# Patient Record
Sex: MALE | Race: WHITE | NOT HISPANIC OR LATINO | Employment: PART TIME | URBAN - METROPOLITAN AREA
[De-identification: names, ages, dates, MRNs, and addresses within clinical notes are randomized per-mention and may not be internally consistent; named-entity substitution may affect disease eponyms.]

---

## 2023-01-12 ENCOUNTER — OFFICE VISIT (OUTPATIENT)
Dept: FAMILY MEDICINE | Facility: CLINIC | Age: 23
End: 2023-01-12
Payer: COMMERCIAL

## 2023-01-12 VITALS
SYSTOLIC BLOOD PRESSURE: 115 MMHG | DIASTOLIC BLOOD PRESSURE: 73 MMHG | WEIGHT: 216 LBS | BODY MASS INDEX: 29.26 KG/M2 | HEART RATE: 74 BPM | OXYGEN SATURATION: 100 % | HEIGHT: 72 IN | TEMPERATURE: 97.5 F | RESPIRATION RATE: 16 BRPM

## 2023-01-12 DIAGNOSIS — K59.00 CONSTIPATION, UNSPECIFIED CONSTIPATION TYPE: ICD-10-CM

## 2023-01-12 DIAGNOSIS — F41.1 GAD (GENERALIZED ANXIETY DISORDER): Primary | ICD-10-CM

## 2023-01-12 PROCEDURE — 96127 BRIEF EMOTIONAL/BEHAV ASSMT: CPT | Performed by: INTERNAL MEDICINE

## 2023-01-12 PROCEDURE — 99203 OFFICE O/P NEW LOW 30 MIN: CPT | Performed by: INTERNAL MEDICINE

## 2023-01-12 RX ORDER — HYDROXYZINE PAMOATE 50 MG/1
50 CAPSULE ORAL 3 TIMES DAILY PRN
Qty: 90 CAPSULE | Refills: 1 | Status: SHIPPED | OUTPATIENT
Start: 2023-01-12 | End: 2023-10-24

## 2023-01-12 RX ORDER — POLYETHYLENE GLYCOL 3350 17 G/17G
17 POWDER, FOR SOLUTION ORAL DAILY
Qty: 510 G | Refills: 1 | Status: SHIPPED | OUTPATIENT
Start: 2023-01-12 | End: 2023-10-24

## 2023-01-12 ASSESSMENT — PATIENT HEALTH QUESTIONNAIRE - PHQ9
SUM OF ALL RESPONSES TO PHQ QUESTIONS 1-9: 3
5. POOR APPETITE OR OVEREATING: SEVERAL DAYS

## 2023-01-12 ASSESSMENT — ANXIETY QUESTIONNAIRES
6. BECOMING EASILY ANNOYED OR IRRITABLE: NOT AT ALL
1. FEELING NERVOUS, ANXIOUS, OR ON EDGE: MORE THAN HALF THE DAYS
2. NOT BEING ABLE TO STOP OR CONTROL WORRYING: SEVERAL DAYS
IF YOU CHECKED OFF ANY PROBLEMS ON THIS QUESTIONNAIRE, HOW DIFFICULT HAVE THESE PROBLEMS MADE IT FOR YOU TO DO YOUR WORK, TAKE CARE OF THINGS AT HOME, OR GET ALONG WITH OTHER PEOPLE: SOMEWHAT DIFFICULT
5. BEING SO RESTLESS THAT IT IS HARD TO SIT STILL: SEVERAL DAYS
7. FEELING AFRAID AS IF SOMETHING AWFUL MIGHT HAPPEN: NOT AT ALL
3. WORRYING TOO MUCH ABOUT DIFFERENT THINGS: SEVERAL DAYS
GAD7 TOTAL SCORE: 6
GAD7 TOTAL SCORE: 6

## 2023-01-12 ASSESSMENT — PAIN SCALES - GENERAL: PAINLEVEL: NO PAIN (0)

## 2023-01-12 NOTE — PROGRESS NOTES
"  Assessment & Plan     SHEY (generalized anxiety disorder)  He has a longstanding history of anxiety  SHEY score is 6 today  He was on Zoloft in the past but for reasons that remain unclear he stopped taking it  Never had side effects from it in the past  We discussed about getting back on Zoloft  He is willing to try it again and I advised him that it takes at least 4 weeks to start showing its effects  If the anxiety gets better we can slowly taper it off say after 6 months  He can also take hydroxyzine as needed  Also they are interested in counseling  - sertraline (ZOLOFT) 50 MG tablet; Take 1 tablet (50 mg) by mouth daily  - hydrOXYzine (VISTARIL) 50 MG capsule; Take 1 capsule (50 mg) by mouth 3 times daily as needed for itching  - Adult Mental Health  Referral; Future    Constipation, unspecified constipation type  He has pellet-like stools and abdominal bloating  Has been going on for 1 year  He thinks things became worse after use of antibiotics for toenail infection  He does endorse a lot of anxiety and the bowel symptoms getting worse with anxiety  I think he has IBS and anxiety is making it worse  He never has diarrhea  This is constipation predominant IBS  We will start MiraLAX 1 scoop full daily and then if need be increase it to 1 scoop full twice daily  Review back in 2 months  At this point I do not have any results to do any imaging or colonoscopy but if his symptoms does not improve I will certainly proceed with those  - polyethylene glycol (MIRALAX) 17 GM/Dose powder; Take 17 g by mouth daily      30 minutes spent on the date of the encounter doing chart review, history and exam, documentation and further activities per the note       BMI:   Estimated body mass index is 29.09 kg/m  as calculated from the following:    Height as of this encounter: 1.835 m (6' 0.25\").    Weight as of this encounter: 98 kg (216 lb).           Return in about 2 months (around 3/12/2023).    Boris Steele, " "MD ZELAYA [facility] SHANTAL Finley is a 22 year old accompanied by his mother, presenting for the following health issues:  Constipation (For past year )      History of Present Illness       Reason for visit:  Constipation/stomach discomfort  Symptom onset:  More than a month  Symptoms include:  Constipation, stomach discomfort  Symptom intensity:  Severe  Symptom progression:  Worsening  Had these symptoms before:  No  What makes it worse:  Dairy, stress/anxiety  What makes it better:  Not much    He eats 0-1 servings of fruits and vegetables daily.He consumes 0 sweetened beverage(s) daily.He exercises with enough effort to increase his heart rate 9 or less minutes per day.  He exercises with enough effort to increase his heart rate 3 or less days per week.   He is taking medications regularly.       Constipation  Onset/Duration: 1 year   Description:  Frequency of bowel movements: on an almost daily basis  Consistency of stool:solid   Progression of Symptoms: same  Accompanying signs and symptoms:    Abdominal pain: No   Rectal pain: No   Blood in stool: No   Nausea/Vomiting: No   Weight loss or gain: No  History:   Similar problems in past: No  History of abdominal surgery: No  Chronic laxative use: No  New medications: No  Precipitating or alleviating factors: N/a   Therapies tried and outcome: None        Review of Systems   Constitutional, HEENT, cardiovascular, pulmonary, gi and gu systems are negative, except as otherwise noted.      Objective    /73 (BP Location: Left arm, Patient Position: Chair, Cuff Size: Adult Regular)   Pulse 74   Temp 97.5  F (36.4  C) (Tympanic)   Resp 16   Ht 1.835 m (6' 0.25\")   Wt 98 kg (216 lb)   SpO2 100%   BMI 29.09 kg/m    Body mass index is 29.09 kg/m .  Physical Exam   GENERAL: healthy, alert and no distress  NECK: no adenopathy, no asymmetry, masses, or scars and thyroid normal to palpation  RESP: lungs clear to auscultation - no " rales, rhonchi or wheezes  CV: regular rate and rhythm, normal S1 S2, no S3 or S4, no murmur, click or rub, no peripheral edema and peripheral pulses strong  ABDOMEN: soft, nontender, no hepatosplenomegaly, no masses and bowel sounds normal  MS: no gross musculoskeletal defects noted, no edema

## 2023-10-24 ENCOUNTER — ANCILLARY PROCEDURE (OUTPATIENT)
Dept: GENERAL RADIOLOGY | Facility: CLINIC | Age: 23
End: 2023-10-24
Attending: PHYSICIAN ASSISTANT
Payer: COMMERCIAL

## 2023-10-24 ENCOUNTER — OFFICE VISIT (OUTPATIENT)
Dept: FAMILY MEDICINE | Facility: CLINIC | Age: 23
End: 2023-10-24
Payer: COMMERCIAL

## 2023-10-24 ENCOUNTER — TELEPHONE (OUTPATIENT)
Dept: FAMILY MEDICINE | Facility: CLINIC | Age: 23
End: 2023-10-24

## 2023-10-24 VITALS
WEIGHT: 228.1 LBS | OXYGEN SATURATION: 98 % | TEMPERATURE: 98.9 F | BODY MASS INDEX: 30.23 KG/M2 | HEIGHT: 73 IN | HEART RATE: 86 BPM | SYSTOLIC BLOOD PRESSURE: 122 MMHG | DIASTOLIC BLOOD PRESSURE: 84 MMHG | RESPIRATION RATE: 16 BRPM

## 2023-10-24 DIAGNOSIS — R06.02 SOB (SHORTNESS OF BREATH): ICD-10-CM

## 2023-10-24 DIAGNOSIS — Z13.220 SCREENING FOR HYPERLIPIDEMIA: ICD-10-CM

## 2023-10-24 DIAGNOSIS — R07.89 ATYPICAL CHEST PAIN: Primary | ICD-10-CM

## 2023-10-24 DIAGNOSIS — Z13.1 SCREENING FOR DIABETES MELLITUS: ICD-10-CM

## 2023-10-24 LAB
ALBUMIN SERPL BCG-MCNC: 5.2 G/DL (ref 3.5–5.2)
ALP SERPL-CCNC: 85 U/L (ref 40–129)
ALT SERPL W P-5'-P-CCNC: 19 U/L (ref 0–70)
ANION GAP SERPL CALCULATED.3IONS-SCNC: 13 MMOL/L (ref 7–15)
AST SERPL W P-5'-P-CCNC: 26 U/L (ref 0–45)
BASOPHILS # BLD AUTO: 0.1 10E3/UL (ref 0–0.2)
BASOPHILS NFR BLD AUTO: 1 %
BILIRUB SERPL-MCNC: 0.4 MG/DL
BUN SERPL-MCNC: 12.4 MG/DL (ref 6–20)
CALCIUM SERPL-MCNC: 9.9 MG/DL (ref 8.6–10)
CHLORIDE SERPL-SCNC: 104 MMOL/L (ref 98–107)
CHOLEST SERPL-MCNC: 167 MG/DL
CREAT SERPL-MCNC: 0.74 MG/DL (ref 0.67–1.17)
D DIMER PPP FEU-MCNC: <0.27 UG/ML FEU (ref 0–0.5)
DEPRECATED HCO3 PLAS-SCNC: 23 MMOL/L (ref 22–29)
EGFRCR SERPLBLD CKD-EPI 2021: >90 ML/MIN/1.73M2
EOSINOPHIL # BLD AUTO: 0.2 10E3/UL (ref 0–0.7)
EOSINOPHIL NFR BLD AUTO: 3 %
ERYTHROCYTE [DISTWIDTH] IN BLOOD BY AUTOMATED COUNT: 11.6 % (ref 10–15)
GLUCOSE SERPL-MCNC: 95 MG/DL (ref 70–99)
HCT VFR BLD AUTO: 44.7 % (ref 40–53)
HDLC SERPL-MCNC: 36 MG/DL
HGB BLD-MCNC: 15.2 G/DL (ref 13.3–17.7)
IMM GRANULOCYTES # BLD: 0 10E3/UL
IMM GRANULOCYTES NFR BLD: 0 %
LDLC SERPL CALC-MCNC: 111 MG/DL
LYMPHOCYTES # BLD AUTO: 2.2 10E3/UL (ref 0.8–5.3)
LYMPHOCYTES NFR BLD AUTO: 30 %
MCH RBC QN AUTO: 29.1 PG (ref 26.5–33)
MCHC RBC AUTO-ENTMCNC: 34 G/DL (ref 31.5–36.5)
MCV RBC AUTO: 86 FL (ref 78–100)
MONOCYTES # BLD AUTO: 0.6 10E3/UL (ref 0–1.3)
MONOCYTES NFR BLD AUTO: 8 %
NEUTROPHILS # BLD AUTO: 4.2 10E3/UL (ref 1.6–8.3)
NEUTROPHILS NFR BLD AUTO: 58 %
NONHDLC SERPL-MCNC: 131 MG/DL
PLATELET # BLD AUTO: 294 10E3/UL (ref 150–450)
POTASSIUM SERPL-SCNC: 4.6 MMOL/L (ref 3.4–5.3)
PROT SERPL-MCNC: 8.3 G/DL (ref 6.4–8.3)
RBC # BLD AUTO: 5.22 10E6/UL (ref 4.4–5.9)
SODIUM SERPL-SCNC: 140 MMOL/L (ref 135–145)
TRIGL SERPL-MCNC: 101 MG/DL
TSH SERPL DL<=0.005 MIU/L-ACNC: 1.61 UIU/ML (ref 0.3–4.2)
WBC # BLD AUTO: 7.3 10E3/UL (ref 4–11)

## 2023-10-24 PROCEDURE — 90480 ADMN SARSCOV2 VAC 1/ONLY CMP: CPT | Performed by: PHYSICIAN ASSISTANT

## 2023-10-24 PROCEDURE — 71046 X-RAY EXAM CHEST 2 VIEWS: CPT | Mod: TC | Performed by: RADIOLOGY

## 2023-10-24 PROCEDURE — 84443 ASSAY THYROID STIM HORMONE: CPT | Performed by: PHYSICIAN ASSISTANT

## 2023-10-24 PROCEDURE — 99214 OFFICE O/P EST MOD 30 MIN: CPT | Mod: 25 | Performed by: PHYSICIAN ASSISTANT

## 2023-10-24 PROCEDURE — 91320 SARSCV2 VAC 30MCG TRS-SUC IM: CPT | Performed by: PHYSICIAN ASSISTANT

## 2023-10-24 PROCEDURE — 80061 LIPID PANEL: CPT | Performed by: PHYSICIAN ASSISTANT

## 2023-10-24 PROCEDURE — 80053 COMPREHEN METABOLIC PANEL: CPT | Performed by: PHYSICIAN ASSISTANT

## 2023-10-24 PROCEDURE — 90686 IIV4 VACC NO PRSV 0.5 ML IM: CPT | Performed by: PHYSICIAN ASSISTANT

## 2023-10-24 PROCEDURE — 93000 ELECTROCARDIOGRAM COMPLETE: CPT | Performed by: PHYSICIAN ASSISTANT

## 2023-10-24 PROCEDURE — 85379 FIBRIN DEGRADATION QUANT: CPT | Performed by: PHYSICIAN ASSISTANT

## 2023-10-24 PROCEDURE — 36415 COLL VENOUS BLD VENIPUNCTURE: CPT | Performed by: PHYSICIAN ASSISTANT

## 2023-10-24 PROCEDURE — 90471 IMMUNIZATION ADMIN: CPT | Performed by: PHYSICIAN ASSISTANT

## 2023-10-24 PROCEDURE — 85025 COMPLETE CBC W/AUTO DIFF WBC: CPT | Performed by: PHYSICIAN ASSISTANT

## 2023-10-24 ASSESSMENT — PAIN SCALES - GENERAL: PAINLEVEL: NO PAIN (0)

## 2023-10-24 NOTE — RESULT ENCOUNTER NOTE
Please call and advise that -Ddimer was negative- there is not a blood clot to the lung.  Your blood counts and hemoglobin were normal.    Your electrolytes, blood sugar, kidney function and liver function were normal.   Please call patient and advise that cholesterol looks ok- ideally we would like to see the HDL higher (good cholesterol).  This can be improved with regular aerobic exercise.   Return urgently if any change in symptoms.    Follow up with us if symptoms don't improve over the next 3 weeks

## 2023-10-24 NOTE — TELEPHONE ENCOUNTER
RN called patient and LVM to call clinic at [phone].      If patient calls back, please relay provider message below.     Racheal Lott RN       ----- Message from Delilah Randle PA-C sent at 10/24/2023  4:29 PM CDT -----  Please call and advise that -Ddimer was negative- there is not a blood clot to the lung.  Your blood counts and hemoglobin were normal.    Your electrolytes, blood sugar, kidney function and liver function were normal.   Please call patient and advise that cholesterol looks ok- ideally we would like to see the HDL higher (good cholesterol).  This can be improved with regular aerobic exercise.   Return urgently if any change in symptoms.    Follow up with us if symptoms don't improve over the next 3 weeks

## 2023-10-24 NOTE — PROGRESS NOTES
"  Assessment & Plan     Atypical chest pain  Chest pain has improved.  Not exertional.  Normal EKG  - EKG 12-lead complete w/read - Clinics  - TSH with free T4 reflex  - TSH with free T4 reflex    SOB (shortness of breath)  Normal chest xray.  Normal Ddimer.  Not anemic. Rule out thyroid disease   History of anxiety - declines treatment  - XR Chest 2 Views  - CBC with platelets and differential  - D dimer, quantitative  - EKG 12-lead complete w/read - Clinics  - CBC with platelets and differential  - D dimer, quantitative  - TSH with free T4 reflex  - TSH with free T4 reflex    Screening for hyperlipidemia    - Lipid panel reflex to direct LDL Fasting  - Lipid panel reflex to direct LDL Fasting    Screening for diabetes mellitus    - Comprehensive metabolic panel (BMP + Alb, Alk Phos, ALT, AST, Total. Bili, TP)  - Comprehensive metabolic panel (BMP + Alb, Alk Phos, ALT, AST, Total. Bili, TP)      Review of the result(s) of each unique test - chest xray, EKG ,,cbc   Ordering of each unique test         BMI:   Estimated body mass index is 30.09 kg/m  as calculated from the following:    Height as of this encounter: 1.854 m (6' 1\").    Weight as of this encounter: 103.5 kg (228 lb 1.6 oz).   Weight management plan: Discussed healthy diet and exercise guidelines    Patient Instructions   We will call with lab results  Return urgently if any change in symptoms like increasing pain, shortness of breath, vomiting or other change in symptoms .      Delilah Randle PA-C  [facility] KEVIN Finley is a 22 year old, presenting for the following health issues:  Follow Up (Chest Pain)        10/24/2023     8:23 AM   Additional Questions   Roomed by CHUCK Galvin   Accompanied by Self         10/24/2023     8:23 AM   Patient Reported Additional Medications   Patient reports taking the following new medications None       History of Present Illness       Reason for visit:  Trouble taking deep " "breaths  Symptom onset:  3-4 weeks ago    He eats 0-1 servings of fruits and vegetables daily.He consumes 0 sweetened beverage(s) daily.He exercises with enough effort to increase his heart rate 20 to 29 minutes per day.  He exercises with enough effort to increase his heart rate 3 or less days per week.   He is taking medications regularly.         Chest Pain  Onset/Duration: 5-6 days ago, lasted 2-3 days, mainly when breathing deep.  Description:   Location: right side  Character: achey  Radiation: Over right shoulder  Duration: constant   Intensity: mild  Progression of Symptoms: improving  Accompanying Signs & Symptoms:  Shortness of breath: YES- More so trouble taking deep breath, sitting normally \"felt like more effort to breathe\".  Sweating: No  Nausea/vomiting: No  Lightheadedness: No  Palpitations: No  Fever/Chills: No  Cough: No           Heartburn: No - \"Felt like congestion / maybe cough especially at night.\"  History:   Family history of heart disease: No  Tobacco use: No  Previous similar symptoms: no   Precipitating factors:   Worse with exertion: No  Worse with deep breaths: YES           Related to eating: Had mexican food / Dairy and thought maybe that was reason for pain.           Better with burping: \"Not particularly, maybe felt like it was a bit lighter.\"  Alleviating factors: \"Kind of just went with it\"  Therapies tried and outcome: Nothing      Patient unfamiliar to me presents with chest pain and shortness of breath.  History of anxiety Not on zoloft since at least June or July (4 months)  Feeling like having trouble breathing deep  Chest pain 2-3 days mostly when breathe deep back of chest and shoulder blade.   Felt like not fully able to deep breathe-sometimes thought it could be attributed to anxiety  No chest pain any more - the breathing is the issue  No coughing.  At times congestion and cough   Dealing with anxiety 3-4 yrs.  Went to doctor last (9 months ago) due to constipation " "problems and trouble going to bathroom - has improved   Reports will go to work and sit there and thinks something is wrong.    Had to work Saturday and Sunday- stressed or worried whether or not he will wake up on time.  Month ago at home by myself and trouble getting to sleep and trouble breathing deep - last 3-4 weeks more troublesome- no fever, sweats, chills.  No weight loss.  Works as  at "[employer]" in [place].  Doesn't want medications for anxiety  Review of Systems   Constitutional, HEENT, cardiovascular, pulmonary, gi and gu systems are negative, except as otherwise noted.      Objective    /84 (BP Location: Right arm, Patient Position: Sitting, Cuff Size: Adult Regular)   Pulse 86   Temp 98.9  F (37.2  C) (Tympanic)   Resp 16   Ht 1.854 m (6' 1\")   Wt 103.5 kg (228 lb 1.6 oz)   SpO2 98%   BMI 30.09 kg/m    Body mass index is 30.09 kg/m .  Physical Exam   GENERAL: healthy, alert and no distress  NECK: no adenopathy, no asymmetry, masses, or scars and thyroid normal to palpation  RESP: lungs clear to auscultation - no rales, rhonchi or wheezes  CV: regular rate and rhythm, normal S1 S2, no S3 or S4, no murmur, click or rub, no peripheral edema and peripheral pulses strong  ABDOMEN: soft, nontender, no hepatosplenomegaly, no masses and bowel sounds normal  MS: no gross musculoskeletal defects noted, no edema  PSYCH: mentation appears normal, affect normal/bright, judgement and insight intact, and appearance disheveled    Xray - Reviewed and interpreted by me.  Normal chest xray reviewed with patient prior to radiology reading   Results for orders placed or performed in visit on 10/24/23   XR Chest 2 Views     Status: None    Narrative    CHEST TWO VIEWS  10/24/2023 9:29 AM     HISTORY:  Shortness of breath.    COMPARISON: None.      Impression    IMPRESSION: Negative chest. Lungs clear.    CELINA RODRIGUEZ MD         SYSTEM ID:  [id number]   Results for orders placed or performed in visit on " 10/24/23   D dimer, quantitative     Status: Normal   Result Value Ref Range    D-Dimer Quantitative <0.27 0.00 - 0.50 ug/mL FEU    Narrative    This D-dimer assay is intended for use in conjunction with a clinical pretest probability assessment model to exclude pulmonary embolism (PE) and deep venous thrombosis (DVT) in outpatients suspected of PE or DVT. The cut-off value is 0.50 ug/mL FEU.   CBC with platelets and differential     Status: None   Result Value Ref Range    WBC Count 7.3 4.0 - 11.0 10e3/uL    RBC Count 5.22 4.40 - 5.90 10e6/uL    Hemoglobin 15.2 13.3 - 17.7 g/dL    Hematocrit 44.7 40.0 - 53.0 %    MCV 86 78 - 100 fL    MCH 29.1 26.5 - 33.0 pg    MCHC 34.0 31.5 - 36.5 g/dL    RDW 11.6 10.0 - 15.0 %    Platelet Count 294 150 - 450 10e3/uL    % Neutrophils 58 %    % Lymphocytes 30 %    % Monocytes 8 %    % Eosinophils 3 %    % Basophils 1 %    % Immature Granulocytes 0 %    Absolute Neutrophils 4.2 1.6 - 8.3 10e3/uL    Absolute Lymphocytes 2.2 0.8 - 5.3 10e3/uL    Absolute Monocytes 0.6 0.0 - 1.3 10e3/uL    Absolute Eosinophils 0.2 0.0 - 0.7 10e3/uL    Absolute Basophils 0.1 0.0 - 0.2 10e3/uL    Absolute Immature Granulocytes 0.0 <=0.4 10e3/uL   CBC with platelets and differential     Status: None    Narrative    The following orders were created for panel order CBC with platelets and differential.  Procedure                               Abnormality         Status                     ---------                               -----------         ------                     CBC with platelets and d...[[id number]]                      Final result                 Please view results for these tests on the individual orders.     XR Chest 2 Views    Result Date: 10/24/2023  CHEST TWO VIEWS  10/24/2023 9:29 AM HISTORY:  Shortness of breath. COMPARISON: None.     IMPRESSION: Negative chest. Lungs clear. CELINA RODRIGUEZ MD   SYSTEM ID:  [id number]         EKG with normal sinus rhythm- no ischemia.  [facility]  reading states LVH but reviewed with Dr. Nicholas-normal EKG

## 2023-10-24 NOTE — PATIENT INSTRUCTIONS
We will call with lab results  Return urgently if any change in symptoms like increasing pain, shortness of breath, vomiting or other change in symptoms .

## 2023-10-25 NOTE — TELEPHONE ENCOUNTER
Patient called back regarding message. Notified him of provider's recommendations below. He verbalized understanding & had no further questions/concerns at this time.     Racheal Lott, ZULEIKAN, RN   [facility]